# Patient Record
Sex: MALE | Race: WHITE | Employment: OTHER | URBAN - METROPOLITAN AREA
[De-identification: names, ages, dates, MRNs, and addresses within clinical notes are randomized per-mention and may not be internally consistent; named-entity substitution may affect disease eponyms.]

---

## 2017-04-07 ENCOUNTER — HOSPITAL ENCOUNTER (OUTPATIENT)
Dept: OTHER | Age: 67
Discharge: HOME | End: 2017-04-07
Attending: CHIROPRACTOR | Admitting: CHIROPRACTOR

## 2017-04-07 DIAGNOSIS — S16.1XXA CERVICAL STRAIN, INITIAL ENCOUNTER: ICD-10-CM

## 2017-04-07 DIAGNOSIS — S29.012A MUSCLE STRAIN OF LEFT UPPER BACK, INITIAL ENCOUNTER: ICD-10-CM

## 2017-06-07 ENCOUNTER — OFFICE VISIT (OUTPATIENT)
Dept: FAMILY MEDICINE CLINIC | Age: 67
End: 2017-06-07

## 2017-06-07 VITALS
DIASTOLIC BLOOD PRESSURE: 80 MMHG | WEIGHT: 158 LBS | SYSTOLIC BLOOD PRESSURE: 118 MMHG | HEART RATE: 64 BPM | BODY MASS INDEX: 22.62 KG/M2 | OXYGEN SATURATION: 98 % | HEIGHT: 70 IN

## 2017-06-07 DIAGNOSIS — G43.709 CHRONIC MIGRAINE WITHOUT AURA WITHOUT STATUS MIGRAINOSUS, NOT INTRACTABLE: ICD-10-CM

## 2017-06-07 DIAGNOSIS — Z23 NEED FOR TDAP VACCINATION: ICD-10-CM

## 2017-06-07 DIAGNOSIS — Z82.49 FAMILY HISTORY OF HEART FAILURE: ICD-10-CM

## 2017-06-07 DIAGNOSIS — M85.80 OSTEOPENIA: ICD-10-CM

## 2017-06-07 DIAGNOSIS — R10.9 RIGHT FLANK PAIN: ICD-10-CM

## 2017-06-07 DIAGNOSIS — I99.8 VASCULAR CALCIFICATION: Primary | ICD-10-CM

## 2017-06-07 PROCEDURE — 99214 OFFICE O/P EST MOD 30 MIN: CPT | Performed by: FAMILY MEDICINE

## 2017-06-07 PROCEDURE — 90471 IMMUNIZATION ADMIN: CPT | Performed by: FAMILY MEDICINE

## 2017-06-07 PROCEDURE — 90715 TDAP VACCINE 7 YRS/> IM: CPT | Performed by: FAMILY MEDICINE

## 2017-06-22 ENCOUNTER — HOSPITAL ENCOUNTER (OUTPATIENT)
Dept: CT IMAGING | Age: 67
Discharge: HOME | End: 2017-06-22
Attending: FAMILY MEDICINE | Admitting: FAMILY MEDICINE

## 2017-06-22 DIAGNOSIS — M85.80 OTHER SPECIFIED DISORDERS OF BONE DENSITY AND STRUCTURE, UNSPECIFIED SITE: ICD-10-CM

## 2017-06-22 DIAGNOSIS — R10.9 RIGHT FLANK PAIN: ICD-10-CM

## 2017-06-22 DIAGNOSIS — M81.0 OSTEOPOROSIS: Primary | ICD-10-CM

## 2017-06-22 DIAGNOSIS — M85.80 OSTEOPENIA: ICD-10-CM

## 2017-06-22 DIAGNOSIS — N20.0 NEPHROLITHIASIS: Primary | ICD-10-CM

## 2017-06-22 LAB
LV EF: 55 %
LVEF MODALITY: NORMAL

## 2017-06-22 RX ORDER — TAMSULOSIN HYDROCHLORIDE 0.4 MG/1
0.4 CAPSULE ORAL DAILY
Qty: 14 CAPSULE | Refills: 0 | Status: SHIPPED | OUTPATIENT
Start: 2017-06-22 | End: 2017-07-03 | Stop reason: SDUPTHER

## 2017-07-03 DIAGNOSIS — N20.0 NEPHROLITHIASIS: ICD-10-CM

## 2017-07-03 RX ORDER — TAMSULOSIN HYDROCHLORIDE 0.4 MG/1
0.4 CAPSULE ORAL DAILY
Qty: 30 CAPSULE | Refills: 3 | Status: SHIPPED | OUTPATIENT
Start: 2017-07-03 | End: 2017-07-05 | Stop reason: CLARIF

## 2017-07-05 DIAGNOSIS — N20.0 NEPHROLITHIASIS: ICD-10-CM

## 2017-07-05 DIAGNOSIS — G43.709 CHRONIC MIGRAINE WITHOUT AURA WITHOUT STATUS MIGRAINOSUS, NOT INTRACTABLE: ICD-10-CM

## 2017-07-05 RX ORDER — TAMSULOSIN HYDROCHLORIDE 0.4 MG/1
0.4 CAPSULE ORAL DAILY
Qty: 90 CAPSULE | Refills: 1 | Status: SHIPPED | OUTPATIENT
Start: 2017-07-05 | End: 2017-08-03

## 2017-07-05 RX ORDER — ZOLMITRIPTAN 2.5 MG/1
2.5 TABLET, FILM COATED ORAL PRN
Qty: 18 TABLET | Refills: 3 | Status: SHIPPED | OUTPATIENT
Start: 2017-07-05 | End: 2017-07-07 | Stop reason: SDUPTHER

## 2017-07-07 ENCOUNTER — TELEPHONE (OUTPATIENT)
Dept: FAMILY MEDICINE CLINIC | Age: 67
End: 2017-07-07

## 2017-07-07 DIAGNOSIS — G43.709 CHRONIC MIGRAINE WITHOUT AURA WITHOUT STATUS MIGRAINOSUS, NOT INTRACTABLE: ICD-10-CM

## 2017-07-07 RX ORDER — ZOLMITRIPTAN 2.5 MG/1
2.5 TABLET, FILM COATED ORAL PRN
Qty: 18 TABLET | Refills: 3 | Status: SHIPPED | OUTPATIENT
Start: 2017-07-07

## 2017-08-03 ENCOUNTER — OFFICE VISIT (OUTPATIENT)
Dept: RHEUMATOLOGY | Age: 67
End: 2017-08-03

## 2017-08-03 VITALS
WEIGHT: 159 LBS | TEMPERATURE: 97.7 F | BODY MASS INDEX: 22.76 KG/M2 | HEIGHT: 70 IN | SYSTOLIC BLOOD PRESSURE: 110 MMHG | DIASTOLIC BLOOD PRESSURE: 80 MMHG

## 2017-08-03 DIAGNOSIS — N20.0 RENAL STONES: ICD-10-CM

## 2017-08-03 DIAGNOSIS — M81.0 AGE-RELATED OSTEOPOROSIS WITHOUT CURRENT PATHOLOGICAL FRACTURE: Primary | ICD-10-CM

## 2017-08-03 DIAGNOSIS — M81.0 AGE-RELATED OSTEOPOROSIS WITHOUT CURRENT PATHOLOGICAL FRACTURE: ICD-10-CM

## 2017-08-03 LAB
CALCIUM SERPL-MCNC: 9 MG/DL (ref 8.3–10.6)
CREAT SERPL-MCNC: 1.1 MG/DL (ref 0.8–1.3)
GFR AFRICAN AMERICAN: >60
GFR NON-AFRICAN AMERICAN: >60
PARATHYROID HORMONE INTACT: 47.1 PG/ML (ref 14–72)
VITAMIN D 25-HYDROXY: 46.8 NG/ML

## 2017-08-03 PROCEDURE — 99204 OFFICE O/P NEW MOD 45 MIN: CPT | Performed by: INTERNAL MEDICINE

## 2017-08-05 LAB
SEX HORMONE BINDING GLOBULIN: 77 NMOL/L (ref 11–80)
TESTOSTERONE FREE-NONMALE: 69.1 PG/ML (ref 47–244)
TESTOSTERONE TOTAL: 591 NG/DL (ref 220–1000)

## 2017-08-16 ENCOUNTER — OFFICE VISIT (OUTPATIENT)
Dept: RHEUMATOLOGY | Age: 67
End: 2017-08-16

## 2017-08-16 VITALS
HEIGHT: 69 IN | DIASTOLIC BLOOD PRESSURE: 78 MMHG | SYSTOLIC BLOOD PRESSURE: 118 MMHG | WEIGHT: 159 LBS | TEMPERATURE: 97.8 F | BODY MASS INDEX: 23.55 KG/M2 | HEART RATE: 72 BPM

## 2017-08-16 DIAGNOSIS — M81.0 AGE-RELATED OSTEOPOROSIS WITHOUT CURRENT PATHOLOGICAL FRACTURE: Primary | ICD-10-CM

## 2017-08-16 PROCEDURE — 99213 OFFICE O/P EST LOW 20 MIN: CPT | Performed by: INTERNAL MEDICINE

## 2017-08-26 DIAGNOSIS — G43.709 CHRONIC MIGRAINE WITHOUT AURA WITHOUT STATUS MIGRAINOSUS, NOT INTRACTABLE: ICD-10-CM

## 2017-08-28 RX ORDER — VERAPAMIL HYDROCHLORIDE 80 MG/1
TABLET ORAL
Qty: 450 TABLET | Refills: 3 | Status: SHIPPED | OUTPATIENT
Start: 2017-08-28

## 2017-09-22 ENCOUNTER — NURSE ONLY (OUTPATIENT)
Dept: RHEUMATOLOGY | Age: 67
End: 2017-09-22

## 2017-09-22 DIAGNOSIS — M81.0 AGE-RELATED OSTEOPOROSIS WITHOUT CURRENT PATHOLOGICAL FRACTURE: ICD-10-CM

## 2017-09-22 PROCEDURE — 96372 THER/PROPH/DIAG INJ SC/IM: CPT | Performed by: INTERNAL MEDICINE

## 2018-03-23 ENCOUNTER — OFFICE VISIT (OUTPATIENT)
Dept: RHEUMATOLOGY | Age: 68
End: 2018-03-23

## 2018-03-23 VITALS
TEMPERATURE: 97.6 F | SYSTOLIC BLOOD PRESSURE: 110 MMHG | HEIGHT: 69 IN | WEIGHT: 159 LBS | DIASTOLIC BLOOD PRESSURE: 80 MMHG | BODY MASS INDEX: 23.55 KG/M2

## 2018-03-23 DIAGNOSIS — M81.8 OTHER OSTEOPOROSIS WITHOUT CURRENT PATHOLOGICAL FRACTURE: Primary | ICD-10-CM

## 2018-03-23 PROCEDURE — 99214 OFFICE O/P EST MOD 30 MIN: CPT | Performed by: INTERNAL MEDICINE

## 2018-03-23 NOTE — PROGRESS NOTES
[address], [place]                                                           [address] [place], [place] [address] [phone] (d) [phone] (Q)    Primary provider: Mar Duran DO  Patient identification: Ashley Lyons: 1950,67 y.o. Sex: male     Assessment:   Robert Damon was seen today for follow-up. Diagnoses and all orders for this visit:    Other osteoporosis without current pathological fracture       Workup for secondary causes including 24-hour urine the calcium, reviewed from care everywhere [facility]. Mild intermittent bone pain, symptoms unmanageable. Unlikely from proteinuria. Normal alkaline phosphatase and calcium levels. Normal renal panel, calcium-blood work reviewed from care everywhere [facility] 3/2/18    Plan:    DEXA Scan-        Date                       T score    6/22/2016                   L spine -3.4    Prolia inj  9/22/17, he will return next week for prolia, once we get our supply. Continue calcium and vitamin D supplementation. No limitation in physical activities. Call us 2 weeks prior to your next appointment in 6 months for necessary blood work evaluation. Patient indicates understanding and agrees with the management plan. I reviewed patient's history, referral documents and electronic medical records. #######################################################################    Subjective-  Follow for osteoporosis. Other medical problems include migraine headaches, congenital kyphosis, kidney stones. He received Prolia 6 months ago for osteoporosis. He has tolerated it well.   States that at times he gets bone pain in his arms and legs, symptoms are mild, manageable

## 2018-03-29 ENCOUNTER — NURSE ONLY (OUTPATIENT)
Dept: RHEUMATOLOGY | Age: 68
End: 2018-03-29

## 2018-03-29 DIAGNOSIS — M80.00XD AGE-RELATED OSTEOPOROSIS WITH CURRENT PATHOLOGICAL FRACTURE WITH ROUTINE HEALING, SUBSEQUENT ENCOUNTER: Primary | ICD-10-CM

## 2018-03-29 PROCEDURE — 96372 THER/PROPH/DIAG INJ SC/IM: CPT | Performed by: INTERNAL MEDICINE

## 2018-09-28 ENCOUNTER — OFFICE VISIT (OUTPATIENT)
Dept: RHEUMATOLOGY | Age: 68
End: 2018-09-28
Payer: MEDICARE

## 2018-09-28 VITALS
WEIGHT: 158 LBS | BODY MASS INDEX: 23.4 KG/M2 | HEIGHT: 69 IN | SYSTOLIC BLOOD PRESSURE: 110 MMHG | HEART RATE: 72 BPM | DIASTOLIC BLOOD PRESSURE: 70 MMHG | TEMPERATURE: 97.8 F

## 2018-09-28 DIAGNOSIS — Z79.899 HIGH RISK MEDICATION USE: ICD-10-CM

## 2018-09-28 DIAGNOSIS — M81.0 OSTEOPOROSIS WITHOUT CURRENT PATHOLOGICAL FRACTURE, UNSPECIFIED OSTEOPOROSIS TYPE: Primary | ICD-10-CM

## 2018-09-28 PROCEDURE — 96372 THER/PROPH/DIAG INJ SC/IM: CPT | Performed by: INTERNAL MEDICINE

## 2018-09-28 PROCEDURE — 99214 OFFICE O/P EST MOD 30 MIN: CPT | Performed by: INTERNAL MEDICINE

## 2019-04-01 ENCOUNTER — OFFICE VISIT (OUTPATIENT)
Dept: RHEUMATOLOGY | Age: 69
End: 2019-04-01
Payer: MEDICARE

## 2019-04-01 VITALS
WEIGHT: 159 LBS | TEMPERATURE: 97.9 F | HEIGHT: 69 IN | BODY MASS INDEX: 23.55 KG/M2 | SYSTOLIC BLOOD PRESSURE: 118 MMHG | HEART RATE: 72 BPM | DIASTOLIC BLOOD PRESSURE: 74 MMHG

## 2019-04-01 DIAGNOSIS — Q67.5 CONGENITAL KYPHOSCOLIOSIS: ICD-10-CM

## 2019-04-01 DIAGNOSIS — M81.0 OSTEOPOROSIS WITHOUT CURRENT PATHOLOGICAL FRACTURE, UNSPECIFIED OSTEOPOROSIS TYPE: Primary | ICD-10-CM

## 2019-04-01 DIAGNOSIS — Z79.899 HIGH RISK MEDICATION USE: ICD-10-CM

## 2019-04-01 PROCEDURE — 99214 OFFICE O/P EST MOD 30 MIN: CPT | Performed by: INTERNAL MEDICINE

## 2019-04-01 PROCEDURE — 96372 THER/PROPH/DIAG INJ SC/IM: CPT | Performed by: INTERNAL MEDICINE

## 2019-04-01 NOTE — PROGRESS NOTES
[address], MD                                                           [address] [place] [address] [phone] (u) [phone] (P)    Primary provider: Atul Burnham DO  Patient identification: Edwin Perez: 1950,68 y.o. Sex: male     Assessment:   Leslie Martinez was seen today for follow-up. Diagnoses and all orders for this visit:    Osteoporosis without current pathological fracture, unspecified osteoporosis type  -     DEXA BONE DENSITY 2 SITES; Future  -     denosumab (PROLIA) SC injection 60 mg    High risk medication use    Congenital kyphoscoliosis       Primary osteoporosis-workup for secondary causes negative. On prolia. No history of osteoporotic fractures. 24-hour urine the calcium, Normal alkaline phosphatase and calcium levels. Normal renal panel, normal testosterone, TSH, calcium. Kyphosis- getting worse, asymptomatic. Followed spine at [address] [place] [address]. Plan:  Recheck DEXA scan  Stay on calcium and vitamin D supplement, prolia is given today. DEXA Scan-        Date                       T score    6/22/2017                  L spine -3.4    Prolia inj  9/22/17,3/29/2018,  9/28/2018, 4/1/2019. Next dose 10/1/2019. No limitation in physical activities. Call us 2 weeks prior to your next appointment in 6 months for necessary blood work evaluation. Patient indicates understanding and agrees with the management plan. I reviewed patient's history, referral documents and electronic medical records. #######################################################################    Subjective-  Follow for osteoporosis.   Other medical problems include migraine headaches, congenital kyphosis, kidney stones. Interval changes- He remains asymptomatic, no intercurrent fractures. Evaluated by [address] [place] [address] because of worsening kyphosis, asymptomatic otherwise. X-ray thoracic spine did not show any fractures, did have DJD and disc height loss. He is here for prolia injection. Tolerating medications well. Denies any muscle cramps, bone pain, intercurrent infections. Saw nephrology for kidney stone, is on citrate supplement. Denies any history of osteoporotic fractures. All other ROS are negative. Past Medical History:   Diagnosis Date    Headache      History reviewed. No pertinent surgical history. Prior to Visit Medications    Medication Sig Taking? Authorizing Provider   simvastatin (ZOCOR) 20 MG tablet TAKE 1 TABLET BY MOUTH  NIGHTLY Yes Archana Weiner DO   citric acid-potassium citrate (POLYCITRA) 1100-334 MG/5ML solution Take 5 mLs by mouth 2 times daily Yes Historical Provider, MD   verapamil (CALAN) 80 MG tablet Take 1 tablet by mouth 5  times daily Yes Archana Weiner DO   ZOLMitriptan (ZOMIG) 2.5 MG tablet Take 1 tablet by mouth as needed for Migraine May repeat in 2 hours if the migraine headache has not resolved (maximum daily dose: 10 mg) Yes Archana Weiner DO   aspirin 81 MG tablet Take 81 mg by mouth daily Yes Historical Provider, MD   denosumab (PROLIA) 60 MG/ML SOLN SC injection Inject 1 mL into the skin once for 1 dose  Zoraida Marcial MD     No Known Allergies      PHYSICAL EXAM:    Vitals:    /74   Pulse 72   Temp 97.9 °F (36.6 °C) (Oral)   Ht 5' 9\" (1.753 m)   Wt 159 lb (72.1 kg)   BMI 23.48 kg/m²   General appearance: alert, appears stated age and cooperative. MKS-other than thoracic kyphosis and scoliosis, normal musculoskeletal examination upper, lower extremities bilaterally. OA changes fingers, knees. Normal gait and muscle strength. Skin: No rashes, no induration or skin thickening or nodules.  No evidence ischemia or deformities noted in digits or nails.      DATA:   Lab Results   Component Value Date    WBC 4.8 10/05/2016    HGB 14.4 10/05/2016    HCT 43.3 10/05/2016    MCV 98.1 10/05/2016     10/05/2016         Chemistry        Component Value Date/Time     10/05/2016 0853    K 4.3 10/05/2016 0853     10/05/2016 0853    CO2 25 10/05/2016 0853    BUN 18 10/05/2016 0853    CREATININE 1.1 08/03/2017 0908        Component Value Date/Time    CALCIUM 9.0 08/03/2017 0908    ALKPHOS 135 (H) 10/05/2016 0853    AST 18 10/05/2016 0853    ALT 22 10/05/2016 0853    BILITOT 0.7 10/05/2016 0853        Lab Results   Component Value Date    PTH 47.1 08/03/2017    CALCIUM 9.0 08/03/2017     Lab Results   Component Value Date    TESTOSTERONE 591 08/03/2017       I thank you for giving me the opportunity to be involved in [facility][note 2] and I look forward following Gerry Aviles along with you. If you have any questions or concerns please feel free to contact me at any time. Reagan Chinchilla  04/01/19     Note is transcribed using voice recognition software. Inadvertent computerized transcription errors may be present.

## 2019-07-02 ENCOUNTER — HOSPITAL ENCOUNTER (OUTPATIENT)
Dept: GENERAL RADIOLOGY | Age: 69
Discharge: HOME | End: 2019-07-02
Payer: MEDICARE

## 2019-07-02 DIAGNOSIS — M81.0 OSTEOPOROSIS WITHOUT CURRENT PATHOLOGICAL FRACTURE, UNSPECIFIED OSTEOPOROSIS TYPE: ICD-10-CM

## 2019-07-02 PROCEDURE — 77080 DXA BONE DENSITY AXIAL: CPT

## 2019-09-23 ENCOUNTER — HOSPITAL ENCOUNTER (OUTPATIENT)
Age: 69
Discharge: HOME | End: 2019-09-23
Payer: MEDICARE

## 2019-09-23 DIAGNOSIS — Z79.899 HIGH RISK MEDICATION USE: ICD-10-CM

## 2019-09-23 LAB
ANION GAP SERPL CALCULATED.3IONS-SCNC: 13 MMOL/L (ref 3–16)
BUN BLDV-MCNC: 18 MG/DL (ref 7–20)
CALCIUM SERPL-MCNC: 9.6 MG/DL (ref 8.3–10.6)
CHLORIDE BLD-SCNC: 102 MMOL/L (ref 99–110)
CO2: 26 MMOL/L (ref 21–32)
CREAT SERPL-MCNC: 1.3 MG/DL (ref 0.8–1.3)
GFR AFRICAN AMERICAN: >60
GFR NON-AFRICAN AMERICAN: 55
GLUCOSE BLD-MCNC: 93 MG/DL (ref 70–99)
POTASSIUM SERPL-SCNC: 4.9 MMOL/L (ref 3.5–5.1)
SODIUM BLD-SCNC: 141 MMOL/L (ref 136–145)

## 2019-09-23 PROCEDURE — 80048 BASIC METABOLIC PNL TOTAL CA: CPT

## 2019-09-23 PROCEDURE — 36415 COLL VENOUS BLD VENIPUNCTURE: CPT

## 2019-10-02 ENCOUNTER — OFFICE VISIT (OUTPATIENT)
Dept: RHEUMATOLOGY | Age: 69
End: 2019-10-02
Payer: MEDICARE

## 2019-10-02 VITALS
WEIGHT: 156 LBS | HEIGHT: 69 IN | SYSTOLIC BLOOD PRESSURE: 120 MMHG | BODY MASS INDEX: 23.11 KG/M2 | DIASTOLIC BLOOD PRESSURE: 74 MMHG

## 2019-10-02 DIAGNOSIS — M47.892 OTHER OSTEOARTHRITIS OF SPINE, CERVICAL REGION: ICD-10-CM

## 2019-10-02 DIAGNOSIS — M81.0 OSTEOPOROSIS WITHOUT CURRENT PATHOLOGICAL FRACTURE, UNSPECIFIED OSTEOPOROSIS TYPE: Primary | ICD-10-CM

## 2019-10-02 DIAGNOSIS — Z79.899 HIGH RISK MEDICATION USE: ICD-10-CM

## 2019-10-02 PROCEDURE — 96372 THER/PROPH/DIAG INJ SC/IM: CPT | Performed by: INTERNAL MEDICINE

## 2019-10-02 PROCEDURE — 99214 OFFICE O/P EST MOD 30 MIN: CPT | Performed by: INTERNAL MEDICINE

## 2019-12-11 ENCOUNTER — HOSPITAL ENCOUNTER (OUTPATIENT)
Age: 69
Discharge: HOME | End: 2019-12-11
Payer: MEDICARE

## 2019-12-11 LAB
ANION GAP SERPL CALCULATED.3IONS-SCNC: 17 MMOL/L (ref 3–16)
BUN BLDV-MCNC: 21 MG/DL (ref 7–20)
CALCIUM SERPL-MCNC: 9.6 MG/DL (ref 8.3–10.6)
CHLORIDE BLD-SCNC: 104 MMOL/L (ref 99–110)
CHOLESTEROL, TOTAL: 127 MG/DL (ref 0–199)
CO2: 24 MMOL/L (ref 21–32)
CREAT SERPL-MCNC: 1.2 MG/DL (ref 0.8–1.3)
GFR AFRICAN AMERICAN: >60
GFR NON-AFRICAN AMERICAN: >60
GLUCOSE BLD-MCNC: 88 MG/DL (ref 70–99)
HDLC SERPL-MCNC: 56 MG/DL (ref 40–60)
LDL CHOLESTEROL CALCULATED: 59 MG/DL
POTASSIUM SERPL-SCNC: 5.3 MMOL/L (ref 3.5–5.1)
PROSTATE SPECIFIC ANTIGEN: 2.1 NG/ML (ref 0–4)
SODIUM BLD-SCNC: 145 MMOL/L (ref 136–145)
TRIGL SERPL-MCNC: 62 MG/DL (ref 0–150)
VITAMIN D 25-HYDROXY: 45.1 NG/ML
VLDLC SERPL CALC-MCNC: 12 MG/DL

## 2019-12-11 PROCEDURE — 84153 ASSAY OF PSA TOTAL: CPT

## 2019-12-11 PROCEDURE — 36415 COLL VENOUS BLD VENIPUNCTURE: CPT

## 2019-12-11 PROCEDURE — 80048 BASIC METABOLIC PNL TOTAL CA: CPT

## 2019-12-11 PROCEDURE — 82306 VITAMIN D 25 HYDROXY: CPT

## 2019-12-11 PROCEDURE — 80061 LIPID PANEL: CPT

## 2019-12-11 PROCEDURE — 83036 HEMOGLOBIN GLYCOSYLATED A1C: CPT

## 2019-12-12 LAB
ESTIMATED AVERAGE GLUCOSE: 108.3 MG/DL
HBA1C MFR BLD: 5.4 %

## 2020-04-23 ENCOUNTER — HOSPITAL ENCOUNTER (OUTPATIENT)
Age: 70
Discharge: HOME | End: 2020-04-23
Payer: MEDICARE

## 2020-04-23 LAB
ANION GAP SERPL CALCULATED.3IONS-SCNC: 11 MMOL/L (ref 3–16)
BUN BLDV-MCNC: 21 MG/DL (ref 7–20)
CALCIUM SERPL-MCNC: 9.8 MG/DL (ref 8.3–10.6)
CHLORIDE BLD-SCNC: 104 MMOL/L (ref 99–110)
CO2: 25 MMOL/L (ref 21–32)
CREAT SERPL-MCNC: 1.2 MG/DL (ref 0.8–1.3)
GFR AFRICAN AMERICAN: >60
GFR NON-AFRICAN AMERICAN: >60
GLUCOSE BLD-MCNC: 92 MG/DL (ref 70–99)
POTASSIUM SERPL-SCNC: 4.6 MMOL/L (ref 3.5–5.1)
SODIUM BLD-SCNC: 140 MMOL/L (ref 136–145)

## 2020-04-23 PROCEDURE — 80048 BASIC METABOLIC PNL TOTAL CA: CPT

## 2020-04-23 PROCEDURE — 36415 COLL VENOUS BLD VENIPUNCTURE: CPT

## 2020-04-30 ENCOUNTER — OFFICE VISIT (OUTPATIENT)
Dept: RHEUMATOLOGY | Age: 70
End: 2020-04-30
Payer: MEDICARE

## 2020-04-30 VITALS — WEIGHT: 160 LBS | TEMPERATURE: 98.2 F | BODY MASS INDEX: 22.9 KG/M2 | HEIGHT: 70 IN

## 2020-04-30 PROCEDURE — 96372 THER/PROPH/DIAG INJ SC/IM: CPT | Performed by: INTERNAL MEDICINE

## 2020-04-30 PROCEDURE — 99214 OFFICE O/P EST MOD 30 MIN: CPT | Performed by: INTERNAL MEDICINE

## 2020-04-30 NOTE — PROGRESS NOTES
[address], MD                                                           [address] [place] [address] 22, [address] [place] [address]                                                             [phone] (F) [phone] (F)    Primary provider: Giovanni Connolly MD  Patient identification: Annie Gtz,: 1950,69 y.o. Sex: male     Assessment:   Mercedez Villanueva was seen today for follow-up. Diagnoses and all orders for this visit:    Osteoporosis without current pathological fracture, unspecified osteoporosis type  -     denosumab (PROLIA) SC injection 60 mg    High risk medication use    Other osteoarthritis of spine, cervical region       Primary osteoporosis-multiple sites, no fractures. Responding well on Prolia. No fractures. Tolerating medications well. Is due for Prolia today. BMP is normal.    Mechanical neck pain-followed by [place] [facility]- stable. Plan:  DEXA Scan-        Date                       T score    2019                  L spine -1.8, , left hip -2.3,F neck -2.2, R T Hip-2.2 and F neck -2.4  2017                  L spine -3.4    Prolia inj  17,3/29/2018,  2018, 2019, 10/2/2019 2020, next dose 10/30/2020. He has calcium rich diet, no need for supplements. Exercises were discussed. Call us 1-2 weeks prior to your next appointment in 6 months for necessary blood work evaluation. Patient indicates understanding and agrees with the management plan. I reviewed patient's history, referral documents and electronic medical records. #######################################################################    Subjective-  Follow for osteoporosis. He is doing well in terms of osteoporosis on Prolia. No complaints or concerns.   DEXA scan shows

## 2020-10-29 ENCOUNTER — HOSPITAL ENCOUNTER (OUTPATIENT)
Age: 70
Discharge: HOME | End: 2020-10-29
Payer: MEDICARE

## 2020-10-29 LAB
ANION GAP SERPL CALCULATED.3IONS-SCNC: 10 MMOL/L (ref 3–16)
BUN BLDV-MCNC: 18 MG/DL (ref 7–20)
CALCIUM SERPL-MCNC: 9 MG/DL (ref 8.3–10.6)
CHLORIDE BLD-SCNC: 104 MMOL/L (ref 99–110)
CO2: 28 MMOL/L (ref 21–32)
CREAT SERPL-MCNC: 1.3 MG/DL (ref 0.8–1.3)
GFR AFRICAN AMERICAN: >60
GFR NON-AFRICAN AMERICAN: 55
GLUCOSE BLD-MCNC: 91 MG/DL (ref 70–99)
POTASSIUM SERPL-SCNC: 4.8 MMOL/L (ref 3.5–5.1)
SODIUM BLD-SCNC: 142 MMOL/L (ref 136–145)

## 2020-10-29 PROCEDURE — 80048 BASIC METABOLIC PNL TOTAL CA: CPT

## 2020-10-29 PROCEDURE — 36415 COLL VENOUS BLD VENIPUNCTURE: CPT

## 2020-11-02 ENCOUNTER — OFFICE VISIT (OUTPATIENT)
Dept: RHEUMATOLOGY | Age: 70
End: 2020-11-02
Payer: MEDICARE

## 2020-11-02 VITALS — BODY MASS INDEX: 22.9 KG/M2 | WEIGHT: 160 LBS | TEMPERATURE: 97.4 F | HEIGHT: 70 IN

## 2020-11-02 PROCEDURE — 96372 THER/PROPH/DIAG INJ SC/IM: CPT | Performed by: INTERNAL MEDICINE

## 2020-11-02 PROCEDURE — 99214 OFFICE O/P EST MOD 30 MIN: CPT | Performed by: INTERNAL MEDICINE

## 2020-11-02 NOTE — PROGRESS NOTES
[address] [place] [address] [phone] (W) [phone] (F)    Primary provider: Jenny Mendenhall MD  Patient identification: Carson Gtz,: 1950,70 y.o. Sex: male     Assessment:   Lisa Porter was seen today for follow-up. Diagnoses and all orders for this visit:    Osteoporosis without current pathological fracture, unspecified osteoporosis type  -     denosumab (PROLIA) SC injection 60 mg    High risk medication use       Primary osteoporosis-multiple sites, no fractures. Remains stable, DEXA scan improving. Tolerating Prolia well. Mechanical neck pain-followed by [place] [facility]- stable. Plan:  DEXA Scan-        Date                       T score    2019                  L spine -1.8, , left hip -2.3,F neck -2.2, R T Hip-2.2 and F neck -2.4  2017                  L spine -3.4    Prolia inj  17,3/29/2018,  2018, 2019, 10/2/2019 2020, given today 2020. Has calcium rich diet. Continue weightbearing exercises. Follow-up in 6 months, call us couple of weeks prior to appointment for lab work. Patient indicates understanding and agrees with the management plan. I reviewed patient's history, referral documents and electronic medical records. #######################################################################    Subjective-  Follow for osteoporosis. He is doing well, has no complaints or concerns today. Tolerating Prolia well. Denies any muscle pain, cramps, infections, or any fractures. T-scores have improved. Mechanical pain in his spine is stable, followed by orthopedics. All other review of systems are negative.   He had kidney 10/29/2020 1017        Component Value Date/Time    CALCIUM 9.0 10/29/2020 1017    ALKPHOS 135 (H) 10/05/2016 0853    AST 18 10/05/2016 0853    ALT 22 10/05/2016 0853    BILITOT 0.7 10/05/2016 0853        Lab Results   Component Value Date    PTH 47.1 08/03/2017    CALCIUM 9.0 10/29/2020     Lab Results   Component Value Date    TESTOSTERONE 591 08/03/2017       I thank you for giving me the opportunity to be involved in [facility] and I look forward following Constance Henson along with you. If you have any questions or concerns please feel free to contact me at any time. Yesenia Hansen  11/02/20     Note is transcribed using voice recognition software. Inadvertent computerized transcription errors may be present.

## 2020-12-28 ENCOUNTER — HOSPITAL ENCOUNTER (OUTPATIENT)
Age: 70
Discharge: HOME | End: 2020-12-28
Payer: MEDICARE

## 2020-12-28 LAB
ALBUMIN SERPL-MCNC: 4.4 G/DL (ref 3.4–5)
ALP BLD-CCNC: 98 U/L (ref 40–129)
ALT SERPL-CCNC: 34 U/L (ref 10–40)
ANION GAP SERPL CALCULATED.3IONS-SCNC: 9 MMOL/L (ref 3–16)
AST SERPL-CCNC: 26 U/L (ref 15–37)
BILIRUB SERPL-MCNC: 0.4 MG/DL (ref 0–1)
BILIRUBIN DIRECT: <0.2 MG/DL (ref 0–0.3)
BILIRUBIN, INDIRECT: NORMAL MG/DL (ref 0–1)
BUN BLDV-MCNC: 19 MG/DL (ref 7–20)
CALCIUM SERPL-MCNC: 9.2 MG/DL (ref 8.3–10.6)
CHLORIDE BLD-SCNC: 106 MMOL/L (ref 99–110)
CHOLESTEROL, TOTAL: 115 MG/DL (ref 0–199)
CO2: 27 MMOL/L (ref 21–32)
CREAT SERPL-MCNC: 1.4 MG/DL (ref 0.8–1.3)
GFR AFRICAN AMERICAN: >60
GFR NON-AFRICAN AMERICAN: 50
GLUCOSE BLD-MCNC: 98 MG/DL (ref 70–99)
HDLC SERPL-MCNC: 44 MG/DL (ref 40–60)
LDL CHOLESTEROL CALCULATED: 61 MG/DL
POTASSIUM SERPL-SCNC: 4.6 MMOL/L (ref 3.5–5.1)
PROSTATE SPECIFIC ANTIGEN: 2.59 NG/ML (ref 0–4)
SODIUM BLD-SCNC: 142 MMOL/L (ref 136–145)
TOTAL PROTEIN: 6.5 G/DL (ref 6.4–8.2)
TRIGL SERPL-MCNC: 52 MG/DL (ref 0–150)
VLDLC SERPL CALC-MCNC: 10 MG/DL

## 2020-12-28 PROCEDURE — G0103 PSA SCREENING: HCPCS

## 2020-12-28 PROCEDURE — 83036 HEMOGLOBIN GLYCOSYLATED A1C: CPT

## 2020-12-28 PROCEDURE — 84153 ASSAY OF PSA TOTAL: CPT

## 2020-12-28 PROCEDURE — 80061 LIPID PANEL: CPT

## 2020-12-28 PROCEDURE — 80048 BASIC METABOLIC PNL TOTAL CA: CPT

## 2020-12-28 PROCEDURE — 36415 COLL VENOUS BLD VENIPUNCTURE: CPT

## 2020-12-28 PROCEDURE — 80076 HEPATIC FUNCTION PANEL: CPT

## 2020-12-29 LAB
ESTIMATED AVERAGE GLUCOSE: 108.3 MG/DL
HBA1C MFR BLD: 5.4 %

## 2021-01-26 ENCOUNTER — HOSPITAL ENCOUNTER (OUTPATIENT)
Age: 71
Discharge: HOME | End: 2021-01-26
Payer: MEDICARE

## 2021-01-26 LAB
ANION GAP SERPL CALCULATED.3IONS-SCNC: 8 MMOL/L (ref 3–16)
BUN BLDV-MCNC: 18 MG/DL (ref 7–20)
CALCIUM SERPL-MCNC: 9.8 MG/DL (ref 8.3–10.6)
CHLORIDE BLD-SCNC: 104 MMOL/L (ref 99–110)
CO2: 29 MMOL/L (ref 21–32)
CREAT SERPL-MCNC: 1.1 MG/DL (ref 0.8–1.3)
GFR AFRICAN AMERICAN: >60
GFR NON-AFRICAN AMERICAN: >60
GLUCOSE BLD-MCNC: 92 MG/DL (ref 70–99)
POTASSIUM SERPL-SCNC: 4.3 MMOL/L (ref 3.5–5.1)
SODIUM BLD-SCNC: 141 MMOL/L (ref 136–145)

## 2021-01-26 PROCEDURE — 36415 COLL VENOUS BLD VENIPUNCTURE: CPT

## 2021-01-26 PROCEDURE — 80048 BASIC METABOLIC PNL TOTAL CA: CPT

## 2021-04-23 ENCOUNTER — PATIENT MESSAGE (OUTPATIENT)
Dept: RHEUMATOLOGY | Age: 71
End: 2021-04-23

## 2021-04-23 DIAGNOSIS — M80.00XA AGE-RELATED OSTEOPOROSIS WITH CURRENT PATHOLOGICAL FRACTURE, INITIAL ENCOUNTER: Primary | ICD-10-CM

## 2021-04-26 ENCOUNTER — TELEPHONE (OUTPATIENT)
Dept: RHEUMATOLOGY | Age: 71
End: 2021-04-26

## 2021-04-26 NOTE — TELEPHONE ENCOUNTER
From: Thao Renee  To: Ethan Purdy MD  Sent: 4/23/2021 2:04 PM EDT  Subject: Non-Urgent Medical Question    I have an appointment with Dr. Radha Oconnor on May 3rd @ 8:45 am for a Prolia Injection. I need her to issue an order for blood work in preparation for that visit. I get blood drawn at [facility]. Please contact me when the order is available. Thank you, Christian Jackson.  Kenia Vines    [phone]

## 2021-04-27 ENCOUNTER — HOSPITAL ENCOUNTER (OUTPATIENT)
Age: 71
Discharge: HOME | End: 2021-04-27
Payer: MEDICARE

## 2021-04-27 LAB
ANION GAP SERPL CALCULATED.3IONS-SCNC: 7 MMOL/L (ref 3–16)
BUN BLDV-MCNC: 23 MG/DL (ref 7–20)
CALCIUM SERPL-MCNC: 9.5 MG/DL (ref 8.3–10.6)
CHLORIDE BLD-SCNC: 105 MMOL/L (ref 99–110)
CO2: 30 MMOL/L (ref 21–32)
CREAT SERPL-MCNC: 1.2 MG/DL (ref 0.8–1.3)
GFR AFRICAN AMERICAN: >60
GFR NON-AFRICAN AMERICAN: >60
GLUCOSE BLD-MCNC: 81 MG/DL (ref 70–99)
POTASSIUM SERPL-SCNC: 4.7 MMOL/L (ref 3.5–5.1)
SODIUM BLD-SCNC: 142 MMOL/L (ref 136–145)

## 2021-04-27 PROCEDURE — 80048 BASIC METABOLIC PNL TOTAL CA: CPT

## 2021-04-27 PROCEDURE — 36415 COLL VENOUS BLD VENIPUNCTURE: CPT

## 2021-05-03 ENCOUNTER — OFFICE VISIT (OUTPATIENT)
Dept: RHEUMATOLOGY | Age: 71
End: 2021-05-03
Payer: MEDICARE

## 2021-05-03 VITALS — BODY MASS INDEX: 22.9 KG/M2 | WEIGHT: 160 LBS | HEIGHT: 70 IN

## 2021-05-03 DIAGNOSIS — M81.0 SENILE OSTEOPOROSIS: ICD-10-CM

## 2021-05-03 DIAGNOSIS — M80.00XA AGE-RELATED OSTEOPOROSIS WITH CURRENT PATHOLOGICAL FRACTURE, INITIAL ENCOUNTER: Primary | ICD-10-CM

## 2021-05-03 PROCEDURE — 96372 THER/PROPH/DIAG INJ SC/IM: CPT | Performed by: INTERNAL MEDICINE

## 2021-05-03 PROCEDURE — 99213 OFFICE O/P EST LOW 20 MIN: CPT | Performed by: INTERNAL MEDICINE

## 2021-05-03 NOTE — PROGRESS NOTES
T-scores have improved. No secondary causes. All other review of systems are negative. He had kidney stones, is on citrate supplement. Normal ADLs and recreational activities. Past Medical History:   Diagnosis Date    Headache      History reviewed. No pertinent surgical history. Prior to Visit Medications    Medication Sig Taking? Authorizing Provider   simvastatin (ZOCOR) 20 MG tablet TAKE 1 TABLET BY MOUTH  NIGHTLY  Sostephani Weiner, DO   citric acid-potassium citrate (POLYCITRA) 1100-334 MG/5ML solution Take 5 mLs by mouth 2 times daily  Historical Provider, MD   verapamil (CALAN) 80 MG tablet Take 1 tablet by mouth 5  times daily  Sostephani Weiner DO   denosumab (PROLIA) 60 MG/ML SOLN SC injection Inject 1 mL into the skin once for 1 dose  Devon Jones MD   ZOLMitriptan (ZOMIG) 2.5 MG tablet Take 1 tablet by mouth as needed for Migraine May repeat in 2 hours if the migraine headache has not resolved (maximum daily dose: 10 mg)  Archana Weiner DO   aspirin 81 MG tablet Take 81 mg by mouth daily  Historical Provider, MD     No Known Allergies      PHYSICAL EXAM:    Vitals:    Ht 5' 10\" (1.778 m)   Wt 160 lb (72.6 kg)   BMI 22.96 kg/m²   General appearance: alert, appears stated age and cooperative. MKS-no physical findings to be appreciated. Normal musculoskeletal examination and upper, lower extremities other than asymptomatic OA changes across his scattered DIPs asymptomatic. Thoracic kyphosis and scoliosis without any focal tenderness. Normal gait, muscle strength in upper and lower extremities. Skin: No rashes, no induration or skin thickening or nodules. No evidence ischemia or deformities noted in digits or nails.       DATA:   Lab Results   Component Value Date    WBC 4.8 10/05/2016    HGB 14.4 10/05/2016    HCT 43.3 10/05/2016    MCV 98.1 10/05/2016     10/05/2016         Chemistry        Component Value Date/Time     04/27/2021 1138    K 4.7 04/27/2021 1138

## 2021-07-13 ENCOUNTER — HOSPITAL ENCOUNTER (OUTPATIENT)
Dept: GENERAL RADIOLOGY | Age: 71
Discharge: HOME | End: 2021-07-13
Payer: MEDICARE

## 2021-07-13 DIAGNOSIS — M81.0 SENILE OSTEOPOROSIS: ICD-10-CM

## 2021-07-13 PROCEDURE — 77080 DXA BONE DENSITY AXIAL: CPT

## 2021-11-04 ENCOUNTER — OFFICE VISIT (OUTPATIENT)
Dept: RHEUMATOLOGY | Age: 71
End: 2021-11-04
Payer: MEDICARE

## 2021-11-04 VITALS
HEIGHT: 70 IN | SYSTOLIC BLOOD PRESSURE: 122 MMHG | BODY MASS INDEX: 21.9 KG/M2 | WEIGHT: 153 LBS | DIASTOLIC BLOOD PRESSURE: 84 MMHG

## 2021-11-04 DIAGNOSIS — M15.9 GENERALIZED OSTEOARTHRITIS: ICD-10-CM

## 2021-11-04 DIAGNOSIS — Z79.899 HIGH RISK MEDICATION USE: ICD-10-CM

## 2021-11-04 DIAGNOSIS — M81.0 SENILE OSTEOPOROSIS: Primary | ICD-10-CM

## 2021-11-04 PROCEDURE — 96372 THER/PROPH/DIAG INJ SC/IM: CPT | Performed by: INTERNAL MEDICINE

## 2021-11-04 PROCEDURE — 99214 OFFICE O/P EST MOD 30 MIN: CPT | Performed by: INTERNAL MEDICINE

## 2021-11-04 NOTE — PROGRESS NOTES
[address] [facility], MD                                                                                                                      [phone] (O) [phone] (F)    Primary provider: Lopez Navas MD  Patient identification: Dandre Gtz,: 1950,71 y.o. Sex: male     Assessment:   Corey Fortune was seen today for follow-up and injections. Diagnoses and all orders for this visit:    Age-related osteoporosis with current pathological fracture, initial encounter  -     denosumab (PROLIA) SC injection 60 mg       Primary osteoporosis-acceptable DEXA scan especially comparing his DEXA scan from 2017, on Prolia. Tolerating medications well. Mechanical back pain-stable, used to see provider at [address] [place] [address]      Plan:  Prolia injected today. He has adequate calcium and vitamin D intake. Continue weightbearing exercises. Acetaminophen as needed for intercurrent arthralgias. Avoid NSAID because of fluctuating creatinine. DEXA Scan-        Date                       T score    2021              L spine -2.8     L Hip -1.9, LFN -2.3 , R hip -2.3, R FN - 2.3    2019                  L spine -1.8, , left hip -2.3,F neck -2.2, R T Hip-2.2 and F neck -2.4  2017                  L spine -3.4    Prolia inj  17,3/29/2018,  2018, 2019, 10/2/2019 2020, 2020, 5/3/2021, 2021, next dose will be May 5, 2022. BMP-fluctuating serum creatinine. Sometimes takes Aleve for mechanical back pain. Recommend avoiding NSAIDs completely. Follow-up in 6 months, call us couple of weeks prior to appointment for lab work. Patient indicates understanding and agrees with the management plan.   I reviewed patient's history, referral documents and electronic medical records. #######################################################################    Subjective-  Follow for osteoporosis. Doing well in terms of osteoporosis and osteoarthritis. No fragility fractures. Tolerating Prolia well. DEXA scan stable other than lumbar T score which is declined significantly. Comparing to 2017, appears to be reasonable response to Prolia. Tolerating medications well. Normal ADLs and recreational activities. Past Medical History:   Diagnosis Date    Headache      History reviewed. No pertinent surgical history. Prior to Visit Medications    Medication Sig Taking? Authorizing Provider   simvastatin (ZOCOR) 20 MG tablet TAKE 1 TABLET BY MOUTH  NIGHTLY  Archana Weiner, DO   citric acid-potassium citrate (POLYCITRA) 1100-334 MG/5ML solution Take 5 mLs by mouth 2 times daily  Historical Provider, MD   verapamil (CALAN) 80 MG tablet Take 1 tablet by mouth 5  times daily  Archana Weiner DO   denosumab (PROLIA) 60 MG/ML SOLN SC injection Inject 1 mL into the skin once for 1 dose  Za Yanez MD   ZOLMitriptan (ZOMIG) 2.5 MG tablet Take 1 tablet by mouth as needed for Migraine May repeat in 2 hours if the migraine headache has not resolved (maximum daily dose: 10 mg)  Archana Weiner, DO   aspirin 81 MG tablet Take 81 mg by mouth daily  Historical Provider, MD     No Known Allergies      PHYSICAL EXAM:    Vitals:    /84   Ht 5' 10\" (1.778 m)   Wt 153 lb (69.4 kg)   BMI 21.95 kg/m²   General appearance: alert, appears stated age and cooperative. MKS-no appreciable tender swollen inflamed joints in upper or lower extremities. F ROM in all peripheral joints. Thoracic kyphosis and scoliosis without any focal tenderness. Normal gait, muscle strength in upper and lower extremities. Skin: No rashes, no induration or skin thickening or nodules. No evidence ischemia or deformities noted in digits or nails.       DATA:   Lab Results   Component Value Date    WBC 4.8 10/05/2016    HGB 14.4 10/05/2016    HCT 43.3 10/05/2016    MCV 98.1 10/05/2016     10/05/2016         Chemistry        Component Value Date/Time     04/27/2021 1138    K 4.7 04/27/2021 1138     04/27/2021 1138    CO2 30 04/27/2021 1138    BUN 23 (H) 04/27/2021 1138    CREATININE 1.2 04/27/2021 1138        Component Value Date/Time    CALCIUM 9.5 04/27/2021 1138    ALKPHOS 98 12/28/2020 1220    AST 26 12/28/2020 1220    ALT 34 12/28/2020 1220    BILITOT 0.4 12/28/2020 1220        Lab Results   Component Value Date    PTH 47.1 08/03/2017    CALCIUM 9.5 04/27/2021     Lab Results   Component Value Date    TESTOSTERONE 591 08/03/2017       I thank you for giving me the opportunity to be involved in [facility] care and I look forward following Camille Alamo along with you. If you have any questions or concerns please feel free to contact me at any time. Mehrdad Castañeda  11/04/21   Total time 32 minutes-reviewing history, exam, DEXA scan, explaining treatment benefit, rationale, and medical documentation. Note is transcribed using voice recognition software. Inadvertent computerized transcription errors may be present.

## 2022-05-10 ENCOUNTER — TELEPHONE (OUTPATIENT)
Dept: RHEUMATOLOGY | Age: 72
End: 2022-05-10

## 2022-05-10 NOTE — TELEPHONE ENCOUNTER
VOB  PROLIA   Deductible-$ 800.00   Met $ 800.00  OOP-$1700.00  Met-$ 943.11  Co Ins-10%  Covered- 90%  Called [phone] spoke with Suze Corrales   Ref #-[id number]  No PA Required

## 2022-05-10 NOTE — TELEPHONE ENCOUNTER
Please obtain VOB and prior authorization for Prolia  through medical benefit, buy and bill.      Dx: M81.0 Osteoporosis   Labs: 4/27/22  DEXA: 7/13/21    Primary: Navin Larios [facility]  # [id number]    CONTINUATION OF TREATMENT   Prolia inj  9/22/17,3/29/2018,  9/28/2018, 4/1/2019, 10/2/2019 4/30/2020, 11/2/2020, 5/3/2021, November 4, 2021 NEXT DOSE 5/31/2022    DEXA Scans   7/2/2019  L spine -1.8 left hip -2.3 neck -2.2 R T hip -2.2 and F neck -2.4  6/22/2017 L spine -3.4

## 2022-05-24 DIAGNOSIS — M81.0 AGE-RELATED OSTEOPOROSIS WITHOUT CURRENT PATHOLOGICAL FRACTURE: Primary | ICD-10-CM

## 2022-05-26 ENCOUNTER — HOSPITAL ENCOUNTER (OUTPATIENT)
Age: 72
Discharge: HOME | End: 2022-05-26
Payer: MEDICARE

## 2022-05-26 DIAGNOSIS — M81.0 AGE-RELATED OSTEOPOROSIS WITHOUT CURRENT PATHOLOGICAL FRACTURE: ICD-10-CM

## 2022-05-26 LAB
ANION GAP SERPL CALCULATED.3IONS-SCNC: 12 MMOL/L (ref 3–16)
BUN BLDV-MCNC: 19 MG/DL (ref 7–20)
CALCIUM SERPL-MCNC: 9.4 MG/DL (ref 8.3–10.6)
CHLORIDE BLD-SCNC: 105 MMOL/L (ref 99–110)
CO2: 25 MMOL/L (ref 21–32)
CREAT SERPL-MCNC: 1.3 MG/DL (ref 0.8–1.3)
GFR AFRICAN AMERICAN: >60
GFR NON-AFRICAN AMERICAN: 55
GLUCOSE BLD-MCNC: 85 MG/DL (ref 70–99)
POTASSIUM SERPL-SCNC: 4.4 MMOL/L (ref 3.5–5.1)
SODIUM BLD-SCNC: 142 MMOL/L (ref 136–145)
VITAMIN D 25-HYDROXY: 47.8 NG/ML

## 2022-05-26 PROCEDURE — 80048 BASIC METABOLIC PNL TOTAL CA: CPT

## 2022-05-26 PROCEDURE — 82306 VITAMIN D 25 HYDROXY: CPT

## 2022-05-26 PROCEDURE — 36415 COLL VENOUS BLD VENIPUNCTURE: CPT

## 2022-05-31 ENCOUNTER — OFFICE VISIT (OUTPATIENT)
Dept: RHEUMATOLOGY | Age: 72
End: 2022-05-31
Payer: MEDICARE

## 2022-05-31 VITALS
HEART RATE: 64 BPM | BODY MASS INDEX: 24.05 KG/M2 | HEIGHT: 70 IN | WEIGHT: 168 LBS | SYSTOLIC BLOOD PRESSURE: 116 MMHG | DIASTOLIC BLOOD PRESSURE: 72 MMHG

## 2022-05-31 DIAGNOSIS — M15.9 GENERALIZED OSTEOARTHRITIS: ICD-10-CM

## 2022-05-31 DIAGNOSIS — M81.0 SENILE OSTEOPOROSIS: Primary | ICD-10-CM

## 2022-05-31 DIAGNOSIS — Z79.899 HIGH RISK MEDICATION USE: ICD-10-CM

## 2022-05-31 PROCEDURE — 96372 THER/PROPH/DIAG INJ SC/IM: CPT | Performed by: INTERNAL MEDICINE

## 2022-05-31 PROCEDURE — 99214 OFFICE O/P EST MOD 30 MIN: CPT | Performed by: INTERNAL MEDICINE

## 2022-05-31 PROCEDURE — 1123F ACP DISCUSS/DSCN MKR DOCD: CPT | Performed by: INTERNAL MEDICINE

## 2022-05-31 RX ORDER — ATORVASTATIN CALCIUM 40 MG/1
40 TABLET, FILM COATED ORAL DAILY
COMMUNITY

## 2022-05-31 NOTE — PROGRESS NOTES
[address] [place] [address], MD                                                                                                                       (T) [phone] (F)    Primary provider: Lindsay Reeder MD  Patient identification: Amy Gtz,: 1950,71 y.o. Sex: male     Assessment:   Belvie Goldberg was seen today for follow-up. Diagnoses and all orders for this visit:    Senile osteoporosis  -     denosumab (PROLIA) SC injection 60 mg    High risk medication use    Generalized osteoarthritis             Primary osteoporosis-stable. No fragility fractures. On Prolia. Mechanical neck pain pain-improved after nerve ablation, followed at [address] [place] [address]:  Prolia given today. No additional calcium vitamin D supplement needed, levels are normal.  Has history of calcium containing stones. Avoid NSAID because of fluctuating creatinine. DEXA Scan-        Date                       T score    2021              L spine -2.8     L Hip -1.9, LFN -2.3 , R hip -2.3, R FN - 2.3    2019                  L spine -1.8, , left hip -2.3,F neck -2.2, R T Hip-2.2 and F neck -2.4  2017                  L spine -3.4    Prolia inj  17,3/29/2018,  2018, 2019, 10/2/2019 2020, 2020, 5/3/2021, 2021, May 31, 2022. Next injection will be on 2022. Follow-up in 6 months, call us couple of weeks prior to appointment for lab work. Patient indicates understanding and agrees with the management plan. I reviewed patient's history, referral documents and electronic medical records. #######################################################################    Subjective-  Follow for osteoporosis. He is doing well, no fragility fractures. Tolerating Prolia well. Is followed by [facility] for neck pain from degenerative arthritis. Nerve ablation has helped him quite a bit with neck stiffness and  headaches. No other complaints or concerns. Active physically. Normal ADLs and recreational activities. Past Medical History:   Diagnosis Date    Headache      No past surgical history on file. Prior to Visit Medications    Medication Sig Taking? Authorizing Provider   atorvastatin (LIPITOR) 40 MG tablet Take 40 mg by mouth daily Yes Historical Provider, MD   LOSARTAN POTASSIUM PO Take by mouth Yes Historical Provider, MD   verapamil (CALAN) 80 MG tablet Take 1 tablet by mouth 5  times daily Yes Archana Weiner DO   aspirin 81 MG tablet Take 81 mg by mouth daily Yes Historical Provider, MD   citric acid-potassium citrate (POLYCITRA) 1100-334 MG/5ML solution Take 5 mLs by mouth 2 times daily  Patient not taking: Reported on 5/31/2022  Historical Provider, MD   denosumab (PROLIA) 60 MG/ML SOLN SC injection Inject 1 mL into the skin once for 1 dose  Anny Rizo MD   ZOLMitriptan (ZOMIG) 2.5 MG tablet Take 1 tablet by mouth as needed for Migraine May repeat in 2 hours if the migraine headache has not resolved (maximum daily dose: 10 mg)  Patient not taking: Reported on 5/31/2022  Archana Sheehan,      No Known Allergies      PHYSICAL EXAM:    Vitals:    /72 (Site: Left Upper Arm, Position: Sitting)   Pulse 64   Ht 5' 10\" (1.778 m)   Wt 168 lb (76.2 kg)   BMI 24.11 kg/m²   General appearance: alert, appears stated age and cooperative. MKS-No appreciable tender, swollen, inflamed joints in upper or lower extremities. Normal range of motion in peripheral joints in upper and lower extremities. Normal gait, muscle strength in upper and lower extremities. Skin: No rashes, no induration or skin thickening or nodules. No evidence ischemia or deformities noted in digits or nails.       DATA:   Lab Results   Component Value Date    WBC 4.8 10/05/2016    HGB 14.4 10/05/2016    HCT 43.3 10/05/2016    MCV 98.1 10/05/2016     10/05/2016         Chemistry        Component Value Date/Time     05/26/2022 1144    K 4.4 05/26/2022 1144     05/26/2022 1144    CO2 25 05/26/2022 1144    BUN 19 05/26/2022 1144    CREATININE 1.3 05/26/2022 1144        Component Value Date/Time    CALCIUM 9.4 05/26/2022 1144    ALKPHOS 98 12/28/2020 1220    AST 26 12/28/2020 1220    ALT 34 12/28/2020 1220    BILITOT 0.4 12/28/2020 1220        Lab Results   Component Value Date    PTH 47.1 08/03/2017    CALCIUM 9.4 05/26/2022     Lab Results   Component Value Date    TESTOSTERONE 591 08/03/2017       I thank you for giving me the opportunity to be involved in [facility] and I look forward following Tri Srivastava along with you. If you have any questions or concerns please feel free to contact me at any time. Sharmila Sheppard  05/31/22   Total time 31 minutes-reviewing history, exam, DEXA scan, explaining treatment benefit, rationale, and medical documentation. Note is transcribed using voice recognition software. Inadvertent computerized transcription errors may be present.

## 2022-11-28 DIAGNOSIS — M81.0 SENILE OSTEOPOROSIS: Primary | ICD-10-CM

## 2022-11-29 LAB
ANION GAP SERPL CALCULATED.3IONS-SCNC: 7 MMOL/L (ref 3–16)
BUN BLDV-MCNC: 27 MG/DL (ref 7–25)
CALCIUM SERPL-MCNC: 10.2 MG/DL (ref 8.6–10.3)
CHLORIDE BLD-SCNC: 105 MMOL/L (ref 98–110)
CO2: 30 MMOL/L (ref 21–33)
CREAT SERPL-MCNC: 1.44 MG/DL (ref 0.6–1.3)
GFR, ESTIMATED: 52
GLUCOSE BLD-MCNC: 80 MG/DL (ref 70–100)
OSMOLALITY CALCULATION: 298 MOSM/KG (ref 278–305)
POTASSIUM SERPL-SCNC: 4.7 MMOL/L (ref 3.5–5.3)
SODIUM BLD-SCNC: 142 MMOL/L (ref 133–146)

## 2022-12-05 ENCOUNTER — OFFICE VISIT (OUTPATIENT)
Dept: RHEUMATOLOGY | Age: 72
End: 2022-12-05
Payer: MEDICARE

## 2022-12-05 VITALS
BODY MASS INDEX: 24.05 KG/M2 | DIASTOLIC BLOOD PRESSURE: 76 MMHG | WEIGHT: 168 LBS | HEIGHT: 70 IN | SYSTOLIC BLOOD PRESSURE: 110 MMHG

## 2022-12-05 DIAGNOSIS — M15.9 GENERALIZED OSTEOARTHRITIS: ICD-10-CM

## 2022-12-05 DIAGNOSIS — M81.0 SENILE OSTEOPOROSIS: Primary | ICD-10-CM

## 2022-12-05 DIAGNOSIS — Z79.899 HIGH RISK MEDICATION USE: ICD-10-CM

## 2022-12-05 PROCEDURE — 99214 OFFICE O/P EST MOD 30 MIN: CPT | Performed by: INTERNAL MEDICINE

## 2022-12-05 PROCEDURE — 1123F ACP DISCUSS/DSCN MKR DOCD: CPT | Performed by: INTERNAL MEDICINE

## 2022-12-05 PROCEDURE — 96372 THER/PROPH/DIAG INJ SC/IM: CPT | Performed by: INTERNAL MEDICINE

## 2022-12-05 NOTE — PROGRESS NOTES
[address] [place] [address], MD                                                                                                                       (E) [phone] (F)    Primary provider: Paige Cabrera MD  Patient identification: Silver Gtz,: 1950,72 y.o. Sex: male     Assessment:   Sravani Gardner was seen today for follow-up and injections. Diagnoses and all orders for this visit:    Senile osteoporosis  -     denosumab (PROLIA) SC injection 60 mg    High risk medication use    Generalized osteoarthritis  -     denosumab (PROLIA) SC injection 60 mg             Primary osteoporosis-stable. No fragility fractures. On Prolia. No fragility fractures. DEXA scan: T-scores are improving. 2. 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 Plan:  Prolia given today. No additional calcium vitamin D supplement needed-has normal vitamin D, and has history of calcium containing stones. Avoid NSAID because of fluctuating creatinine. DEXA Scan-        Date                       T score    2021              L spine -2.8     L Hip -1.9, LFN -2.3 , R hip -2.3, R FN - 2.3    2019                  L spine -1.8, , left hip -2.3,F neck -2.2, R T Hip-2.2 and F neck -2.4  2017                  L spine -3.4    Prolia inj  17,3/29/2018,  2018, 2019, 10/2/2019 2020, 2020, 5/3/2021, 2021, May 31, 2022, 2022. Next injection will be 2023. Follow-up in 6 months, call us couple of weeks prior to appointment for lab work. Patient indicates understanding and agrees with the management plan.   I reviewed patient's history, referral documents and electronic medical records. #######################################################################    Subjective-  Follow for osteoporosis. He is here for follow-up for osteoporosis. Doing well, no side effects from medications. No new fragility fractures. Mechanical back pain-followed by spine. No other complaints or concerns. Active physically. Normal ADLs and recreational activities. Past Medical History:   Diagnosis Date    Headache      History reviewed. No pertinent surgical history. Prior to Visit Medications    Medication Sig Taking? Authorizing Provider   atorvastatin (LIPITOR) 40 MG tablet Take 40 mg by mouth daily Yes Historical Provider, MD   LOSARTAN POTASSIUM PO Take by mouth Yes Historical Provider, MD   verapamil (CALAN) 80 MG tablet Take 1 tablet by mouth 5  times daily Yes Sostephani Weiner, DO   aspirin 81 MG tablet Take 81 mg by mouth daily Yes Historical Provider, MD   denosumab (PROLIA) 60 MG/ML SOLN SC injection Inject 1 mL into the skin once for 1 dose  Tri MD Tanner     No Known Allergies      PHYSICAL EXAM:    Vitals:    /76   Ht 5' 10\" (1.778 m)   Wt 168 lb (76.2 kg)   BMI 24.11 kg/m²   General appearance: alert, appears stated age and cooperative. MKS-normal [address] exam in upper and lower extremities other than subtle osteoarthritic changes in his finger joints and knees. Skin: No rashes, no induration or skin thickening or nodules. No evidence ischemia or deformities noted in digits or nails.     DATA:   Lab Results   Component Value Date    WBC 4.8 10/05/2016    HGB 14.4 10/05/2016    HCT 43.3 10/05/2016    MCV 98.1 10/05/2016     10/05/2016         Chemistry        Component Value Date/Time     11/29/2022 1202    K 4.7 11/29/2022 1202     11/29/2022 1202    CO2 30 11/29/2022 1202    BUN 27 (H) 11/29/2022 1202    CREATININE 1.44 (H) 11/29/2022 1202        Component Value Date/Time    CALCIUM 10.2 11/29/2022 1202    ALKPHOS 98 12/28/2020 1220    AST 26 12/28/2020 1220    ALT 34 12/28/2020 1220    BILITOT 0.4 12/28/2020 1220        Lab Results   Component Value Date    PTH 47.1 08/03/2017    CALCIUM 10.2 11/29/2022     Lab Results   Component Value Date    TESTOSTERONE 591 08/03/2017       I thank you for giving me the opportunity to be involved in [facility] and I look forward following Leyla Reasons along with you. If you have any questions or concerns please feel free to contact me at any time. Jhon Butts  12/06/22   Total time 31 minutes-reviewing history, exam, DEXA scan, explaining treatment benefit, rationale, and medical documentation. Note is transcribed using voice recognition software. Inadvertent computerized transcription errors may be present.